# Patient Record
Sex: FEMALE | Race: WHITE | NOT HISPANIC OR LATINO | Employment: OTHER | URBAN - METROPOLITAN AREA
[De-identification: names, ages, dates, MRNs, and addresses within clinical notes are randomized per-mention and may not be internally consistent; named-entity substitution may affect disease eponyms.]

---

## 2023-05-03 LAB
ANION GAP IN SER/PLAS: 12 MMOL/L (ref 10–20)
CALCIUM (MG/DL) IN SER/PLAS: 9.2 MG/DL (ref 8.6–10.3)
CARBON DIOXIDE, TOTAL (MMOL/L) IN SER/PLAS: 32 MMOL/L (ref 21–32)
CHLORIDE (MMOL/L) IN SER/PLAS: 101 MMOL/L (ref 98–107)
CREATININE (MG/DL) IN SER/PLAS: 0.68 MG/DL (ref 0.5–1.05)
GFR FEMALE: 77 ML/MIN/1.73M2
GLUCOSE (MG/DL) IN SER/PLAS: 85 MG/DL (ref 74–99)
POTASSIUM (MMOL/L) IN SER/PLAS: 3.6 MMOL/L (ref 3.5–5.3)
SODIUM (MMOL/L) IN SER/PLAS: 141 MMOL/L (ref 136–145)
UREA NITROGEN (MG/DL) IN SER/PLAS: 40 MG/DL (ref 6–23)

## 2023-08-18 PROBLEM — R60.0 BILATERAL LEG EDEMA: Status: ACTIVE | Noted: 2023-08-18

## 2023-08-18 PROBLEM — I42.9 CARDIOMYOPATHY (MULTI): Status: ACTIVE | Noted: 2023-08-18

## 2023-08-18 PROBLEM — R07.9 CHEST PAIN OF UNCERTAIN ETIOLOGY: Status: ACTIVE | Noted: 2023-08-18

## 2023-08-18 PROBLEM — R26.81 UNSTEADINESS: Status: ACTIVE | Noted: 2023-08-18

## 2023-08-18 PROBLEM — I73.9 PAD (PERIPHERAL ARTERY DISEASE) (CMS-HCC): Status: ACTIVE | Noted: 2023-08-18

## 2023-08-18 PROBLEM — G25.0 BENIGN ESSENTIAL TREMOR: Status: ACTIVE | Noted: 2023-08-18

## 2023-08-18 PROBLEM — E03.9 HYPOTHYROIDISM: Status: ACTIVE | Noted: 2023-08-18

## 2023-08-18 PROBLEM — R10.9 ABDOMINAL PAIN: Status: ACTIVE | Noted: 2023-08-18

## 2023-08-18 PROBLEM — I10 HYPERTENSION, BENIGN: Status: ACTIVE | Noted: 2023-08-18

## 2023-08-18 PROBLEM — I25.10 ATHSCL HEART DISEASE OF NATIVE CORONARY ARTERY W/O ANG PCTRS: Status: ACTIVE | Noted: 2023-08-18

## 2023-08-18 RX ORDER — LEVOTHYROXINE SODIUM 100 UG/1
TABLET ORAL
COMMUNITY
Start: 2023-04-05 | End: 2023-10-24 | Stop reason: ALTCHOICE

## 2023-08-18 RX ORDER — FUROSEMIDE 80 MG/1
1 TABLET ORAL 2 TIMES DAILY
COMMUNITY
End: 2023-10-24 | Stop reason: DRUGHIGH

## 2023-08-18 RX ORDER — LEVOTHYROXINE SODIUM 112 UG/1
110 TABLET ORAL DAILY
COMMUNITY
Start: 2018-09-01 | End: 2024-04-23 | Stop reason: DRUGHIGH

## 2023-08-18 RX ORDER — TRAMADOL HYDROCHLORIDE 50 MG/1
1 TABLET ORAL EVERY 6 HOURS PRN
COMMUNITY
Start: 2021-07-26

## 2023-08-18 RX ORDER — PROPRANOLOL HYDROCHLORIDE 10 MG/1
1 TABLET ORAL 2 TIMES DAILY
COMMUNITY

## 2023-08-18 RX ORDER — FUROSEMIDE 40 MG/1
1 TABLET ORAL 2 TIMES DAILY
COMMUNITY
Start: 2018-05-14 | End: 2023-10-24 | Stop reason: ALTCHOICE

## 2023-08-18 RX ORDER — POLYETHYLENE GLYCOL 3350 17 G/17G
POWDER, FOR SOLUTION ORAL
COMMUNITY

## 2023-10-20 RX ORDER — ASPIRIN 81 MG/1
81 TABLET ORAL
COMMUNITY
End: 2023-10-24 | Stop reason: ALTCHOICE

## 2023-10-24 ENCOUNTER — OFFICE VISIT (OUTPATIENT)
Dept: CARDIOLOGY | Facility: CLINIC | Age: 88
End: 2023-10-24
Payer: MEDICARE

## 2023-10-24 VITALS
DIASTOLIC BLOOD PRESSURE: 50 MMHG | OXYGEN SATURATION: 97 % | SYSTOLIC BLOOD PRESSURE: 110 MMHG | HEIGHT: 65 IN | HEART RATE: 68 BPM | WEIGHT: 124 LBS | BODY MASS INDEX: 20.66 KG/M2

## 2023-10-24 DIAGNOSIS — G25.0 BENIGN ESSENTIAL TREMOR: ICD-10-CM

## 2023-10-24 DIAGNOSIS — R42 ORTHOSTATIC LIGHTHEADEDNESS: ICD-10-CM

## 2023-10-24 DIAGNOSIS — I10 HYPERTENSION, BENIGN: ICD-10-CM

## 2023-10-24 DIAGNOSIS — I25.10 ATHSCL HEART DISEASE OF NATIVE CORONARY ARTERY W/O ANG PCTRS: Primary | ICD-10-CM

## 2023-10-24 DIAGNOSIS — R60.0 BILATERAL LEG EDEMA: ICD-10-CM

## 2023-10-24 DIAGNOSIS — I42.9 CARDIOMYOPATHY, UNSPECIFIED TYPE (MULTI): ICD-10-CM

## 2023-10-24 DIAGNOSIS — I73.9 PAD (PERIPHERAL ARTERY DISEASE) (CMS-HCC): ICD-10-CM

## 2023-10-24 PROCEDURE — 3074F SYST BP LT 130 MM HG: CPT | Performed by: INTERNAL MEDICINE

## 2023-10-24 PROCEDURE — 1159F MED LIST DOCD IN RCRD: CPT | Performed by: INTERNAL MEDICINE

## 2023-10-24 PROCEDURE — 1160F RVW MEDS BY RX/DR IN RCRD: CPT | Performed by: INTERNAL MEDICINE

## 2023-10-24 PROCEDURE — 3078F DIAST BP <80 MM HG: CPT | Performed by: INTERNAL MEDICINE

## 2023-10-24 PROCEDURE — 99214 OFFICE O/P EST MOD 30 MIN: CPT | Performed by: INTERNAL MEDICINE

## 2023-10-24 RX ORDER — FUROSEMIDE 80 MG/1
80 TABLET ORAL DAILY
Qty: 30 TABLET | Refills: 11 | Status: SHIPPED | OUTPATIENT
Start: 2023-10-24 | End: 2024-03-25

## 2023-10-24 NOTE — PROGRESS NOTES
"Chief Complaint:   Follow-up, Coronary Artery Disease, Cardiomyopathy, and Hypertension (Vanessa is here for a 6 month follow up )     History Of Present Illness:    Vanessa Macedo is a 89 y.o. female presenting with CV dz .  Reports edema better.  Intermittent orthostatic LH  Patient denies chest   pain/SOB/palpitations  Activity limited but lives alone in own home     Last Recorded Vitals:  Vitals:    10/24/23 1318 10/24/23 1336   BP: 110/58 110/50   BP Location: Left arm    Patient Position: Sitting    BP Cuff Size: Adult    Pulse: 68    SpO2: 97%    Weight: 56.2 kg (124 lb)    Height: 1.651 m (5' 5\")             Allergies:  Patient has no known allergies.    Outpatient Medications:  Current Outpatient Medications   Medication Instructions    cholecalciferol, vitamin D3, (VITAMIN D3 ORAL) 1 capsule, oral, Weekly, 125 MCG    furosemide (LASIX) 80 mg, oral, Daily    levothyroxine (Synthroid, Levoxyl) 112 mcg tablet 1 tablet, oral, Daily    multivit-min/iron/FA/vit K/lut (CENTRUM SILVER WOMEN ORAL) Centrum Silver 50+Women Oral Tablet   Refills: 0        Start : 14-Sep-2017   Active    polyethylene glycol (Miralax) 17 gram/dose powder MiraLax 17 GM Oral Packet   Refills: 0       Active    propranolol (Inderal) 10 mg tablet 1 tablet, 2 times daily    traMADol (Ultram) 50 mg tablet 1 tablet, oral, Every 6 hours PRN       Physical Exam:  Constitutional:       General: Awake.      Appearance: Healthy appearance. Not in distress.   Neck:      Vascular: No JVR. JVD normal.   Pulmonary:      Effort: Pulmonary effort is normal.      Breath sounds: Normal breath sounds. No wheezing. No rhonchi. No rales.   Chest:      Chest wall: Not tender to palpatation.   Cardiovascular:      PMI at left midclavicular line. Normal rate. Regular rhythm. Normal S1. Normal S2.       Murmurs: There is no murmur.      No gallop.  No click. No rub.   Pulses:     Intact distal pulses.   Edema:     Pretibial: bilateral trace edema of the pretibial " "area.     Ankle: bilateral trace edema of the ankle.     Feet: bilateral trace edema of the feet.  Abdominal:      General: Bowel sounds are normal.      Palpations: Abdomen is soft.      Tenderness: There is no abdominal tenderness.   Musculoskeletal: Normal range of motion.         General: No tenderness.      Comments: In wheel chair Skin:     General: Skin is warm and dry.   Neurological:      General: No focal deficit present.      Mental Status: Alert and oriented to person, place and time.          Last Labs:  CBC -  Lab Results   Component Value Date    WBC 12.0 (H) 12/13/2018    HGB 12.3 12/13/2018    HCT 37.8 12/13/2018    MCV 97 12/13/2018     12/13/2018       CMP -  Lab Results   Component Value Date    CALCIUM 9.2 05/03/2023       LIPID PANEL -   No results found for: \"CHOL\", \"TRIG\", \"HDL\", \"CHHDL\", \"LDLF\", \"VLDL\", \"NHDL\"    RENAL FUNCTION PANEL -   Lab Results   Component Value Date    GLUCOSE 85 05/03/2023     05/03/2023    K 3.6 05/03/2023     05/03/2023    CO2 32 05/03/2023    ANIONGAP 12 05/03/2023    BUN 40 (H) 05/03/2023    CREATININE 0.68 05/03/2023    CALCIUM 9.2 05/03/2023        No results found for: \"BNP\", \"HGBA1C\"        Lab review: I have personally reviewed the laboratory result(s)       Problem List Items Addressed This Visit       Athscl heart disease of native coronary artery w/o ang pctrs - Primary    Overview     Minimal per cath  Impression: Pt with no definite  angina or ischemic EKG changes-on beta blocker.No ASA with easy bruising/abdominal pain in the past.  7/2021 lipids good.         Benign essential tremor    Overview     On propranolol-stable         Bilateral leg edema    Overview     Improved with diuretic         Cardiomyopathy (CMS/HCC)    Overview     Takotsubo cardiomyopathy   Impression: Resolved-follow clinically.         Hypertension, benign    Overview     BP on low side and some ortho LH-decrease diuretic         PAD (peripheral artery disease) " (CMS/HCC)    Overview     Pt denies current claudication symptoms-although activity limited ... 9/2017 PVR with LLE dz. Follows with Vascular.         Orthostatic lightheadedness    Current Assessment & Plan     DBP low-decrease Lasix to once daily                Arun Crews,

## 2023-10-24 NOTE — PATIENT INSTRUCTIONS
Change furosemide(water pill) to once daily as BP low and you get LH-can take second dose if you notice increased swelling

## 2023-12-31 ENCOUNTER — HOSPITAL ENCOUNTER (EMERGENCY)
Facility: HOSPITAL | Age: 88
Discharge: HOME | End: 2023-12-31
Payer: MEDICARE

## 2023-12-31 VITALS
BODY MASS INDEX: 20.8 KG/M2 | RESPIRATION RATE: 17 BRPM | HEART RATE: 78 BPM | OXYGEN SATURATION: 97 % | SYSTOLIC BLOOD PRESSURE: 115 MMHG | TEMPERATURE: 97.3 F | DIASTOLIC BLOOD PRESSURE: 61 MMHG | WEIGHT: 125 LBS

## 2023-12-31 DIAGNOSIS — S81.802A WOUND OF LEFT LOWER EXTREMITY, INITIAL ENCOUNTER: Primary | ICD-10-CM

## 2023-12-31 PROCEDURE — 2500000004 HC RX 250 GENERAL PHARMACY W/ HCPCS (ALT 636 FOR OP/ED): Performed by: PHYSICIAN ASSISTANT

## 2023-12-31 PROCEDURE — 90471 IMMUNIZATION ADMIN: CPT | Performed by: PHYSICIAN ASSISTANT

## 2023-12-31 PROCEDURE — 99283 EMERGENCY DEPT VISIT LOW MDM: CPT | Mod: 25

## 2023-12-31 PROCEDURE — 96372 THER/PROPH/DIAG INJ SC/IM: CPT

## 2023-12-31 PROCEDURE — 12002 RPR S/N/AX/GEN/TRNK2.6-7.5CM: CPT

## 2023-12-31 PROCEDURE — 90715 TDAP VACCINE 7 YRS/> IM: CPT | Performed by: PHYSICIAN ASSISTANT

## 2023-12-31 PROCEDURE — 2500000001 HC RX 250 WO HCPCS SELF ADMINISTERED DRUGS (ALT 637 FOR MEDICARE OP): Performed by: PHYSICIAN ASSISTANT

## 2023-12-31 PROCEDURE — 99284 EMERGENCY DEPT VISIT MOD MDM: CPT

## 2023-12-31 RX ORDER — CEPHALEXIN 500 MG/1
500 CAPSULE ORAL 3 TIMES DAILY
Qty: 21 CAPSULE | Refills: 0 | Status: SHIPPED | OUTPATIENT
Start: 2023-12-31 | End: 2024-01-07

## 2023-12-31 RX ORDER — CEPHALEXIN 500 MG/1
500 CAPSULE ORAL ONCE
Status: COMPLETED | OUTPATIENT
Start: 2023-12-31 | End: 2023-12-31

## 2023-12-31 RX ADMIN — CEPHALEXIN 500 MG: 500 CAPSULE ORAL at 17:07

## 2023-12-31 RX ADMIN — TETANUS TOXOID, REDUCED DIPHTHERIA TOXOID AND ACELLULAR PERTUSSIS VACCINE, ADSORBED 0.5 ML: 5; 2.5; 8; 8; 2.5 SUSPENSION INTRAMUSCULAR at 17:09

## 2023-12-31 ASSESSMENT — COLUMBIA-SUICIDE SEVERITY RATING SCALE - C-SSRS
1. IN THE PAST MONTH, HAVE YOU WISHED YOU WERE DEAD OR WISHED YOU COULD GO TO SLEEP AND NOT WAKE UP?: NO
6. HAVE YOU EVER DONE ANYTHING, STARTED TO DO ANYTHING, OR PREPARED TO DO ANYTHING TO END YOUR LIFE?: NO
2. HAVE YOU ACTUALLY HAD ANY THOUGHTS OF KILLING YOURSELF?: NO

## 2023-12-31 NOTE — ED PROVIDER NOTES
HPI   Chief Complaint   Patient presents with    Wound Check     Pt arrives private auto from home w daughter. States she cut back of her left leg/calf on stairs on Thursday and has increased swelling/pain. Daughter states she has been changing bandages and that pt will not stop bleeding from site. Bleeding controlled in triage with tape from home. Pt is not on anticoagulants.       HPIThis is 89-year-old female who injured her left calf on a sharp metal on Thursday.  She sustained a laceration because of this.  She presents for further evaluation.  Daughter keeps changing bandage but it continues to bleed.  It seems to be slowing down a bit but she presents here for further recommendations.  Patient is not up-to-date on her tetanus shot.  Denies any allergies.  Is not on any blood thinners.  Is on a water pill.  Is any red streaking or any fevers.  No chest pain shortness of breath cough cold numbness ting weakness headache or visual symptoms.  It is sore when she cut it.  There is 1 area that was more deeper.  This is where her daughter believes is bleeding from                    No data recorded                Patient History   Past Medical History:   Diagnosis Date    Overweight     Overweight (BMI 25.0-29.9)    Personal history of other malignant neoplasm of large intestine     History of malignant neoplasm of colon     Past Surgical History:   Procedure Laterality Date    APPENDECTOMY  03/26/2018    Appendectomy    CHOLECYSTECTOMY  09/14/2017    Cholecystectomy    OTHER SURGICAL HISTORY  06/08/2020    Colectomy    OTHER SURGICAL HISTORY  06/08/2020    Cardiac catheterization    TONSILLECTOMY  09/14/2017    Tonsillectomy     Family History   Problem Relation Name Age of Onset    Other (no family history of cardiac disease) Other       Social History     Tobacco Use    Smoking status: Not on file    Smokeless tobacco: Not on file   Substance Use Topics    Alcohol use: Not on file    Drug use: Not on file        Physical Exam   ED Triage Vitals [12/31/23 1625]   Temp Heart Rate Resp BP   36.3 °C (97.3 °F) 80 16 113/56      SpO2 Temp src Heart Rate Source Patient Position   98 % -- -- --      BP Location FiO2 (%)     -- --       Physical Exam family history social history and allergies reviewed    Review of systems as noted in history of present illness otherwise negative    GEN: vitals as noted. No distress. Afebrile  NECK: Supple.  No adenopathy. FROM.  CARDIAC: Regular rate and rhythm no murmur gallop or rubs  PULMONARY: Equal breath sounds bilaterally.  No rales rhonchi or wheezes.  BACK: Nontender throughout. FROM  LOWER EXTREMITY: Bilateral lower extremity swelling  There is a 6 cm laceration curved aspect overlying the left calf.  Flap has stuck to skin superiorly and there is 1 area that is slightly deeper but with minimal oozing noted currently.  It is tender to palpation.  No signs of infection.  No red streaking up the leg.  FROM with no  pain to eversion and inversion.  No ecchymosis noted. DP +2/4 bilateral. NVIT .  NO Swelling over foot.  The remainder of the extremity is nontender ,specifically, nontender over the knee and fibular head.  SKIN: intact without obvious rash or open wounds noted  NEURO: grossly intact     MDM:    ED Course & MDM   Diagnoses as of 12/31/23 1710   Wound of left lower extremity, initial encounter   I did try to see if any bleeding would occur with manipulation.  I was unable to reapproximate any of the skin that had traveled up and dried onto the wound.  There was slight oozing where I did put some Surgicel into place.  Patient received tetanus shot and we home-going on Keflex.    Medical Decision Making    Follow-up with primary care doctor.  Keep covered with bandage applied.  Wound will heal by second intent.  Return if worsening symptoms.  Do not manipulate wound for the next few days do not get wet.  After 48 hours may rebandage top dressing.  Maybe check for any red  streaking superiorly.  Procedure  Procedures     Charu Frazier PA-C  12/31/23 1959

## 2023-12-31 NOTE — DISCHARGE INSTRUCTIONS
Do not manipulate wound for 48 hours.  Do not get wet.  May remove outer bandage to recheck leg aspect.  Watch for signs of infection such as pus increased redness streaking up the leg.  Follow-up with primary care doctor.  Antibiotics given to prevent infection.  Tetanus booster given as well

## 2024-03-25 DIAGNOSIS — R60.0 LOCALIZED EDEMA: ICD-10-CM

## 2024-03-25 RX ORDER — FUROSEMIDE 80 MG/1
80 TABLET ORAL DAILY
Qty: 90 TABLET | Refills: 3 | Status: SHIPPED | OUTPATIENT
Start: 2024-03-25 | End: 2024-04-23 | Stop reason: DRUGHIGH

## 2024-04-23 ENCOUNTER — OFFICE VISIT (OUTPATIENT)
Dept: CARDIOLOGY | Facility: CLINIC | Age: 89
End: 2024-04-23
Payer: MEDICARE

## 2024-04-23 VITALS — HEART RATE: 62 BPM | SYSTOLIC BLOOD PRESSURE: 125 MMHG | OXYGEN SATURATION: 97 % | DIASTOLIC BLOOD PRESSURE: 58 MMHG

## 2024-04-23 DIAGNOSIS — R60.0 LOCALIZED EDEMA: ICD-10-CM

## 2024-04-23 DIAGNOSIS — I42.9 CARDIOMYOPATHY, UNSPECIFIED TYPE (MULTI): ICD-10-CM

## 2024-04-23 DIAGNOSIS — I25.10 ATHSCL HEART DISEASE OF NATIVE CORONARY ARTERY W/O ANG PCTRS: Primary | ICD-10-CM

## 2024-04-23 DIAGNOSIS — R60.0 BILATERAL LEG EDEMA: ICD-10-CM

## 2024-04-23 DIAGNOSIS — I10 HYPERTENSION, BENIGN: ICD-10-CM

## 2024-04-23 DIAGNOSIS — R42 ORTHOSTATIC LIGHTHEADEDNESS: ICD-10-CM

## 2024-04-23 DIAGNOSIS — G25.0 BENIGN ESSENTIAL TREMOR: ICD-10-CM

## 2024-04-23 PROCEDURE — 1159F MED LIST DOCD IN RCRD: CPT | Performed by: INTERNAL MEDICINE

## 2024-04-23 PROCEDURE — 3078F DIAST BP <80 MM HG: CPT | Performed by: INTERNAL MEDICINE

## 2024-04-23 PROCEDURE — 1160F RVW MEDS BY RX/DR IN RCRD: CPT | Performed by: INTERNAL MEDICINE

## 2024-04-23 PROCEDURE — 3074F SYST BP LT 130 MM HG: CPT | Performed by: INTERNAL MEDICINE

## 2024-04-23 PROCEDURE — 93000 ELECTROCARDIOGRAM COMPLETE: CPT | Performed by: INTERNAL MEDICINE

## 2024-04-23 PROCEDURE — 99213 OFFICE O/P EST LOW 20 MIN: CPT | Performed by: INTERNAL MEDICINE

## 2024-04-23 RX ORDER — FUROSEMIDE 80 MG/1
TABLET ORAL
Qty: 90 TABLET | Refills: 3 | Status: SHIPPED | OUTPATIENT
Start: 2024-04-23

## 2024-04-23 RX ORDER — LEVOTHYROXINE SODIUM 100 UG/1
100 TABLET ORAL DAILY
COMMUNITY
Start: 2024-01-30

## 2024-04-23 NOTE — PROGRESS NOTES
Chief Complaint:   Follow-up (Patient is here for a follow up)     History Of Present Illness:    Vanessa Macedo is a 89 y.o. female presenting with CV dz .  Fell going up steps -deep laceration about ankle  Activity limited by ankle issue  Resulted in increased edema L leg  Improved orthostatic LH  Patient denies chest   pain/SOB/palpitations       Last Recorded Vitals:  Vitals:    04/23/24 1336 04/23/24 1355   BP: 113/71 125/58   BP Location: Left arm    Patient Position: Sitting    BP Cuff Size: Adult    Pulse: 62    SpO2: 97%             Allergies:  Patient has no known allergies.    Outpatient Medications:  Current Outpatient Medications   Medication Instructions    cholecalciferol, vitamin D3, (VITAMIN D3 ORAL) 1 capsule, oral, Once Weekly, 125 MCG    furosemide (Lasix) 80 mg tablet 80 mg AM<BR>40 mg PM    levothyroxine (SYNTHROID, LEVOXYL) 100 mcg, oral, Daily    multivit-min/iron/FA/vit K/lut (CENTRUM SILVER WOMEN ORAL) Centrum Silver 50+Women Oral Tablet   Refills: 0        Start : 14-Sep-2017   Active    polyethylene glycol (Miralax) 17 gram/dose powder MiraLax 17 GM Oral Packet   Refills: 0       Active    propranolol (Inderal) 10 mg tablet 1 tablet, 2 times daily    traMADol (Ultram) 50 mg tablet 1 tablet, oral, Every 6 hours PRN       Physical Exam:  Constitutional:       General: Awake.      Appearance: Healthy appearance. Not in distress.   Neck:      Vascular: No JVR. JVD normal.   Pulmonary:      Effort: Pulmonary effort is normal.      Breath sounds: Normal breath sounds. No wheezing. No rhonchi. No rales.   Chest:      Chest wall: Not tender to palpatation.   Cardiovascular:      PMI at left midclavicular line. Normal rate. Regular rhythm. Normal S1. Normal S2.       Murmurs: There is no murmur.      No gallop.  No click. No rub.   Pulses:     Intact distal pulses.   Edema:     Pretibial: bilateral trace edema of the pretibial area.     Ankle: bilateral trace edema of the ankle.     Feet:  "bilateral trace edema of the feet.  Abdominal:      General: Bowel sounds are normal.      Palpations: Abdomen is soft.      Tenderness: There is no abdominal tenderness.   Musculoskeletal: Normal range of motion.         General: No tenderness.      Comments: In wheel chair  L leg wrapped Skin:     General: Skin is warm and dry.   Neurological:      General: No focal deficit present.      Mental Status: Alert and oriented to person, place and time.          Last Labs:  CBC -  Lab Results   Component Value Date    WBC 12.0 (H) 12/13/2018    HGB 12.3 12/13/2018    HCT 37.8 12/13/2018    MCV 97 12/13/2018     12/13/2018       CMP -  Lab Results   Component Value Date    CALCIUM 9.2 05/03/2023       LIPID PANEL -   No results found for: \"CHOL\", \"TRIG\", \"HDL\", \"CHHDL\", \"LDLF\", \"VLDL\", \"NHDL\"    RENAL FUNCTION PANEL -   Lab Results   Component Value Date    GLUCOSE 85 05/03/2023     05/03/2023    K 3.6 05/03/2023     05/03/2023    CO2 32 05/03/2023    ANIONGAP 12 05/03/2023    BUN 40 (H) 05/03/2023    CREATININE 0.68 05/03/2023    CALCIUM 9.2 05/03/2023        No results found for: \"BNP\", \"HGBA1C\"        Lab review: I have personally reviewed the laboratory result(s)       Problem List Items Addressed This Visit       Athscl heart disease of native coronary artery w/o ang pctrs - Primary    Overview     Minimal per cath  Impression: Pt with no definite  angina or ischemic EKG changes-on beta blocker.No ASA with easy bruising/abdominal pain in the past.  7/2021 lipids good.         Benign essential tremor    Overview     On propranolol-stable         Bilateral leg edema    Overview     Improved with diuretic         Cardiomyopathy (Multi)    Overview     Takotsubo cardiomyopathy   Impression: Resolved-follow clinically.         Hypertension, benign    Overview     Stable on current meds         Orthostatic lightheadedness    Relevant Orders    ECG 12 Lead (Completed)     Other Visit Diagnoses       " Localized edema        Relevant Medications    furosemide (Lasix) 80 mg tablet                Arun Crews DO

## 2024-05-06 ENCOUNTER — OFFICE VISIT (OUTPATIENT)
Dept: WOUND CARE | Facility: CLINIC | Age: 89
End: 2024-05-06
Payer: MEDICARE

## 2024-05-06 PROCEDURE — 99204 OFFICE O/P NEW MOD 45 MIN: CPT | Performed by: PODIATRIST

## 2024-05-06 PROCEDURE — 29580 STRAPPING UNNA BOOT: CPT | Mod: LT

## 2024-05-06 PROCEDURE — 99212 OFFICE O/P EST SF 10 MIN: CPT | Mod: 25

## 2024-05-14 ENCOUNTER — OFFICE VISIT (OUTPATIENT)
Dept: WOUND CARE | Facility: CLINIC | Age: 89
End: 2024-05-14
Payer: MEDICARE

## 2024-05-14 PROCEDURE — 11042 DBRDMT SUBQ TIS 1ST 20SQCM/<: CPT

## 2024-05-21 ENCOUNTER — OFFICE VISIT (OUTPATIENT)
Dept: WOUND CARE | Facility: CLINIC | Age: 89
End: 2024-05-21
Payer: MEDICARE

## 2024-05-21 PROCEDURE — 99212 OFFICE O/P EST SF 10 MIN: CPT

## 2024-05-21 PROCEDURE — 11721 DEBRIDE NAIL 6 OR MORE: CPT

## 2024-10-22 PROBLEM — L97.909 CHRONIC ULCER OF LOWER EXTREMITY (MULTI): Status: ACTIVE | Noted: 2024-10-22

## 2024-10-22 PROBLEM — H61.21 IMPACTED CERUMEN OF RIGHT EAR: Status: ACTIVE | Noted: 2024-10-22

## 2024-10-22 PROBLEM — M25.572 SINUS TARSI SYNDROME OF LEFT ANKLE: Status: ACTIVE | Noted: 2024-10-22

## 2024-10-22 PROBLEM — R60.0 LOCALIZED EDEMA: Status: ACTIVE | Noted: 2024-10-22

## 2024-10-23 ENCOUNTER — APPOINTMENT (OUTPATIENT)
Dept: CARDIOLOGY | Facility: CLINIC | Age: 89
End: 2024-10-23
Payer: MEDICARE

## 2024-10-23 VITALS — DIASTOLIC BLOOD PRESSURE: 60 MMHG | HEART RATE: 70 BPM | OXYGEN SATURATION: 97 % | SYSTOLIC BLOOD PRESSURE: 118 MMHG

## 2024-10-23 DIAGNOSIS — I73.9 PAD (PERIPHERAL ARTERY DISEASE) (CMS-HCC): ICD-10-CM

## 2024-10-23 DIAGNOSIS — I10 HYPERTENSION, BENIGN: ICD-10-CM

## 2024-10-23 DIAGNOSIS — I42.9 CARDIOMYOPATHY, UNSPECIFIED TYPE (MULTI): ICD-10-CM

## 2024-10-23 DIAGNOSIS — R60.0 BILATERAL LEG EDEMA: ICD-10-CM

## 2024-10-23 DIAGNOSIS — I25.10 ATHSCL HEART DISEASE OF NATIVE CORONARY ARTERY W/O ANG PCTRS: Primary | ICD-10-CM

## 2024-10-23 DIAGNOSIS — I65.29 STENOSIS OF CAROTID ARTERY, UNSPECIFIED LATERALITY: ICD-10-CM

## 2024-10-23 DIAGNOSIS — G25.0 BENIGN ESSENTIAL TREMOR: ICD-10-CM

## 2024-10-23 PROCEDURE — 99214 OFFICE O/P EST MOD 30 MIN: CPT | Performed by: INTERNAL MEDICINE

## 2024-10-23 PROCEDURE — G2211 COMPLEX E/M VISIT ADD ON: HCPCS | Performed by: INTERNAL MEDICINE

## 2024-10-23 PROCEDURE — 1159F MED LIST DOCD IN RCRD: CPT | Performed by: INTERNAL MEDICINE

## 2024-10-23 PROCEDURE — 3078F DIAST BP <80 MM HG: CPT | Performed by: INTERNAL MEDICINE

## 2024-10-23 PROCEDURE — 3074F SYST BP LT 130 MM HG: CPT | Performed by: INTERNAL MEDICINE

## 2024-10-23 RX ORDER — GUAIFENESIN 1200 MG
325 TABLET, EXTENDED RELEASE 12 HR ORAL
COMMUNITY
Start: 2024-06-03

## 2024-10-23 RX ORDER — ROSUVASTATIN CALCIUM 5 MG/1
TABLET, COATED ORAL
COMMUNITY
Start: 2024-10-13

## 2024-10-23 NOTE — PROGRESS NOTES
Chief Complaint:   Follow-up (Patient is here for a follow up/)     History Of Present Illness:    Vanessa Macedo is a 89 y.o. female presenting with CV dz .  Legs are weak-uses walker    Patient denies chest pain/SOB/palpitations/dizziness/lightheadedness/edema/claudication         Last Recorded Vitals:  Vitals:    10/23/24 1306 10/23/24 1332   BP: 147/65 118/60   BP Location: Left arm    Patient Position: Sitting    BP Cuff Size: Adult    Pulse: 70    SpO2: 97%             Allergies:  Patient has no known allergies.    Outpatient Medications:  Current Outpatient Medications   Medication Instructions    acetaminophen (TYLENOL) 325 mg    cholecalciferol, vitamin D3, (VITAMIN D3 ORAL) 1 capsule, Once Weekly    furosemide (Lasix) 80 mg tablet 80 mg AM  40 mg PM    levothyroxine (SYNTHROID, LEVOXYL) 100 mcg, Daily    multivit-min/iron/FA/vit K/lut (CENTRUM SILVER WOMEN ORAL) Centrum Silver 50+Women Oral Tablet   Refills: 0        Start : 14-Sep-2017   Active    polyethylene glycol (Miralax) 17 gram/dose powder MiraLax 17 GM Oral Packet   Refills: 0       Active    propranolol (Inderal) 10 mg tablet 1 tablet, 2 times daily    rosuvastatin (Crestor) 5 mg tablet TAKE 1 TABLET AT NIGHT ON MONDAY, WEDNESDAY AND FRIDAY    traMADol (Ultram) 50 mg tablet 1 tablet, Every 6 hours PRN       Physical Exam:  Constitutional:       General: Awake.      Appearance: Healthy appearance. Not in distress.   Neck:      Vascular: No JVR. JVD normal.   Pulmonary:      Effort: Pulmonary effort is normal.      Breath sounds: Normal breath sounds. No wheezing. No rhonchi. No rales.   Chest:      Chest wall: Not tender to palpatation.   Cardiovascular:      PMI at left midclavicular line. Normal rate. Regular rhythm. Normal S1. Normal S2.       Murmurs: There is no murmur.      No gallop.  No click. No rub.   Pulses:     Intact distal pulses.   Edema:     Pretibial: bilateral trace edema of the pretibial area.     Ankle: bilateral trace edema  "of the ankle.     Feet: bilateral trace edema of the feet.  Abdominal:      General: Bowel sounds are normal.      Palpations: Abdomen is soft.      Tenderness: There is no abdominal tenderness.   Musculoskeletal: Normal range of motion.         General: No tenderness.      Comments: In wheel chair  L leg wrapped Skin:     General: Skin is warm and dry.   Neurological:      General: No focal deficit present.      Mental Status: Alert and oriented to person, place and time.          Last Labs:  CBC -  Lab Results   Component Value Date    WBC 12.0 (H) 12/13/2018    HGB 12.3 12/13/2018    HCT 37.8 12/13/2018    MCV 97 12/13/2018     12/13/2018       CMP -  Lab Results   Component Value Date    CALCIUM 9.2 05/03/2023       LIPID PANEL -   No results found for: \"CHOL\", \"TRIG\", \"HDL\", \"CHHDL\", \"LDLF\", \"VLDL\", \"NHDL\"    RENAL FUNCTION PANEL -   Lab Results   Component Value Date    GLUCOSE 85 05/03/2023     05/03/2023    K 3.6 05/03/2023     05/03/2023    CO2 32 05/03/2023    ANIONGAP 12 05/03/2023    BUN 40 (H) 05/03/2023    CREATININE 0.68 05/03/2023    CALCIUM 9.2 05/03/2023 6/2024  K=3.8  Cr=.54  No results found for: \"BNP\", \"HGBA1C\"        Lab review: I have personally reviewed the laboratory result(s)       Problem List Items Addressed This Visit       Athscl heart disease of native coronary artery w/o ang pctrs - Primary    Overview     Minimal per cath  Pt with no   angina -on beta blocker.  No ASA with easy bruising/abdominal pain in the past.  6/2024 lipids OK         Benign essential tremor    Overview     On propranolol-stable         Bilateral leg edema    Overview     Improved with diuretic         Cardiomyopathy    Overview     Takotsubo cardiomyopathy   Impression: Resolved-follow clinically.         Hypertension, benign    Overview     Stable on current meds         PAD (peripheral artery disease) (CMS-[facility])    Overview     Pt denies current claudication symptoms-although activity " limited ... 9/2017 PVR with LLE dz. Follows with Vascular.         Carotid stenosis    Overview     Moderate L dz per 7/2024 duplex                Arun Crews, DO

## 2025-01-14 DIAGNOSIS — R60.0 LOCALIZED EDEMA: ICD-10-CM

## 2025-01-14 RX ORDER — FUROSEMIDE 80 MG/1
TABLET ORAL
COMMUNITY
Start: 2025-01-14

## 2025-04-23 ENCOUNTER — OFFICE VISIT (OUTPATIENT)
Dept: CARDIOLOGY | Facility: CLINIC | Age: OVER 89
End: 2025-04-23
Payer: MEDICARE

## 2025-04-23 VITALS
WEIGHT: 125 LBS | DIASTOLIC BLOOD PRESSURE: 65 MMHG | SYSTOLIC BLOOD PRESSURE: 112 MMHG | HEART RATE: 64 BPM | BODY MASS INDEX: 20.8 KG/M2 | OXYGEN SATURATION: 90 %

## 2025-04-23 DIAGNOSIS — R60.0 BILATERAL LEG EDEMA: ICD-10-CM

## 2025-04-23 DIAGNOSIS — I42.9 CARDIOMYOPATHY, UNSPECIFIED TYPE (MULTI): ICD-10-CM

## 2025-04-23 DIAGNOSIS — R60.0 LOCALIZED EDEMA: ICD-10-CM

## 2025-04-23 DIAGNOSIS — I73.9 PAD (PERIPHERAL ARTERY DISEASE) (CMS-HCC): ICD-10-CM

## 2025-04-23 DIAGNOSIS — I65.29 STENOSIS OF CAROTID ARTERY, UNSPECIFIED LATERALITY: ICD-10-CM

## 2025-04-23 DIAGNOSIS — I25.10 ATHSCL HEART DISEASE OF NATIVE CORONARY ARTERY W/O ANG PCTRS: ICD-10-CM

## 2025-04-23 DIAGNOSIS — I10 HYPERTENSION, BENIGN: Primary | ICD-10-CM

## 2025-04-23 LAB
ATRIAL RATE: 64 BPM
P AXIS: 67 DEGREES
P OFFSET: 170 MS
P ONSET: 119 MS
PR INTERVAL: 184 MS
Q ONSET: 211 MS
QRS COUNT: 10 BEATS
QRS DURATION: 166 MS
QT INTERVAL: 490 MS
QTC CALCULATION(BAZETT): 505 MS
QTC FREDERICIA: 500 MS
R AXIS: -49 DEGREES
T AXIS: 109 DEGREES
T OFFSET: 456 MS
VENTRICULAR RATE: 64 BPM

## 2025-04-23 PROCEDURE — 99214 OFFICE O/P EST MOD 30 MIN: CPT | Performed by: INTERNAL MEDICINE

## 2025-04-23 PROCEDURE — G2211 COMPLEX E/M VISIT ADD ON: HCPCS | Performed by: INTERNAL MEDICINE

## 2025-04-23 PROCEDURE — 93005 ELECTROCARDIOGRAM TRACING: CPT | Performed by: INTERNAL MEDICINE

## 2025-04-23 PROCEDURE — 99212 OFFICE O/P EST SF 10 MIN: CPT | Performed by: INTERNAL MEDICINE

## 2025-04-23 PROCEDURE — 3078F DIAST BP <80 MM HG: CPT | Performed by: INTERNAL MEDICINE

## 2025-04-23 PROCEDURE — 1159F MED LIST DOCD IN RCRD: CPT | Performed by: INTERNAL MEDICINE

## 2025-04-23 PROCEDURE — 93010 ELECTROCARDIOGRAM REPORT: CPT | Performed by: INTERNAL MEDICINE

## 2025-04-23 PROCEDURE — 1036F TOBACCO NON-USER: CPT | Performed by: INTERNAL MEDICINE

## 2025-04-23 PROCEDURE — 3074F SYST BP LT 130 MM HG: CPT | Performed by: INTERNAL MEDICINE

## 2025-04-23 RX ORDER — FUROSEMIDE 80 MG/1
80 TABLET ORAL
Qty: 180 TABLET | Refills: 3 | Status: SHIPPED | OUTPATIENT
Start: 2025-04-23 | End: 2025-04-23 | Stop reason: SDUPTHER

## 2025-04-23 RX ORDER — FUROSEMIDE 80 MG/1
80 TABLET ORAL
Qty: 180 TABLET | Refills: 3 | Status: SHIPPED | OUTPATIENT
Start: 2025-04-23 | End: 2026-04-23

## 2025-04-23 NOTE — PROGRESS NOTES
Chief Complaint:   6 month follow up , Coronary Artery Disease, and Hypertension     History Of Present Illness:    Vanessa Macedo is a 89 y.o. female presenting with CV dz .  Legs are weak-in WC today  Doing home PT  Has lower extremity edema-L more than R    Patient denies chest pain/SOB/palpitations/dizziness/lightheadedness           Last Recorded Vitals:  Vitals:    04/23/25 1212 04/23/25 1227   BP: 130/68 112/65   BP Location: Right arm    Patient Position: Sitting    BP Cuff Size: Adult    Pulse: 64    SpO2: 90%    Weight: 56.7 kg (125 lb)               Allergies:  Patient has no known allergies.    Outpatient Medications:  Current Outpatient Medications   Medication Instructions    cholecalciferol, vitamin D3, (VITAMIN D3 ORAL) 1 capsule, Once Weekly    furosemide (Lasix) 80 mg tablet 80 mg AM  40 mg PM    levothyroxine (SYNTHROID, LEVOXYL) 100 mcg, Daily    multivit-min/iron/FA/vit K/lut (CENTRUM SILVER WOMEN ORAL) Centrum Silver 50+Women Oral Tablet   Refills: 0        Start : 14-Sep-2017   Active    polyethylene glycol (Miralax) 17 gram/dose powder MiraLax 17 GM Oral Packet   Refills: 0       Active    propranolol (Inderal) 10 mg tablet 1 tablet, 2 times daily    rosuvastatin (Crestor) 5 mg tablet TAKE 1 TABLET AT NIGHT ON MONDAY, WEDNESDAY AND FRIDAY    traMADol (Ultram) 50 mg tablet 1 tablet, Every 6 hours PRN       Physical Exam:  Constitutional:       General: Awake.      Appearance: Healthy appearance. Not in distress.   Neck:      Vascular: No JVR. JVD normal.   Pulmonary:      Effort: Pulmonary effort is normal.      Breath sounds: Normal breath sounds. No wheezing. No rhonchi. No rales.   Chest:      Chest wall: Not tender to palpatation.   Cardiovascular:      PMI at left midclavicular line. Normal rate. Regular rhythm. Normal S1. Normal S2.       Murmurs: There is no murmur.      No gallop.  No click. No rub.   Pulses:     Intact distal pulses.   Edema:     Pretibial: bilateral trace edema of  "the pretibial area.     Ankle: bilateral trace edema of the ankle.     Feet: bilateral trace edema of the feet.  Abdominal:      General: Bowel sounds are normal.      Palpations: Abdomen is soft.      Tenderness: There is no abdominal tenderness.   Musculoskeletal: Normal range of motion.         General: No tenderness.      Comments: In wheel chair  L leg wrapped Skin:     General: Skin is warm and dry.   Neurological:      General: No focal deficit present.      Mental Status: Alert and oriented to person, place and time.          Last Labs:  CBC -  Lab Results   Component Value Date    WBC 12.0 (H) 12/13/2018    HGB 12.3 12/13/2018    HCT 37.8 12/13/2018    MCV 97 12/13/2018     12/13/2018       CMP -  Lab Results   Component Value Date    CALCIUM 9.2 05/03/2023       LIPID PANEL -   No results found for: \"CHOL\", \"TRIG\", \"HDL\", \"CHHDL\", \"LDLF\", \"VLDL\", \"NHDL\"    RENAL FUNCTION PANEL -   Lab Results   Component Value Date    GLUCOSE 85 05/03/2023     05/03/2023    K 3.6 05/03/2023     05/03/2023    CO2 32 05/03/2023    ANIONGAP 12 05/03/2023    BUN 40 (H) 05/03/2023    CREATININE 0.68 05/03/2023    CALCIUM 9.2 05/03/2023 6/2024  K=3.8  Cr=.54  No results found for: \"BNP\", \"HGBA1C\"      Lab review: I have personally reviewed the laboratory result(s)       Problem List Items Addressed This Visit       Athscl heart disease of native coronary artery w/o ang pctrs    Overview   Minimal per cath  Pt with no   angina -on beta blocker.  No ASA with easy bruising/abdominal pain in the past.  6/2024 lipids OK         Relevant Orders    ECG 12 lead (Clinic Performed)    Bilateral leg edema    Overview   Suspect more peripheral than central  No thrombus per 3/2025 venous ultrasound  Titrate up furosemide as the edema is bothersome to the patient  Recheck BMP 1 week         Cardiomyopathy    Overview   Takotsubo cardiomyopathy   Impression: Resolved-follow clinically.         Hypertension, benign - " Primary    Overview   Stable on current meds         PAD (peripheral artery disease) (CMS-HCC)    Overview   Pt denies current claudication symptoms-although activity limited ... 9/2017 PVR with LLE dz. Follows with Vascular.         Localized edema    Carotid stenosis    Overview   Moderate L dz per 7/2024 duplex  Stable per exam          Increase furosemide to 80 mg twice daily for swelling  BMP 1 week      Arun Crews, DO

## 2025-05-01 LAB
ANION GAP SERPL CALCULATED.4IONS-SCNC: 9 MMOL/L (CALC) (ref 7–17)
BUN SERPL-MCNC: 41 MG/DL (ref 7–25)
BUN/CREAT SERPL: 62 (CALC) (ref 6–22)
CALCIUM SERPL-MCNC: 9 MG/DL (ref 8.6–10.4)
CHLORIDE SERPL-SCNC: 103 MMOL/L (ref 98–110)
CO2 SERPL-SCNC: 30 MMOL/L (ref 20–32)
CREAT SERPL-MCNC: 0.66 MG/DL (ref 0.6–0.95)
EGFRCR SERPLBLD CKD-EPI 2021: 77 ML/MIN/1.73M2
GLUCOSE SERPL-MCNC: 99 MG/DL (ref 65–99)
POTASSIUM SERPL-SCNC: 3.3 MMOL/L (ref 3.5–5.3)
SODIUM SERPL-SCNC: 142 MMOL/L (ref 135–146)

## 2025-05-02 ENCOUNTER — TELEPHONE (OUTPATIENT)
Dept: CARDIOLOGY | Facility: CLINIC | Age: OVER 89
End: 2025-05-02
Payer: MEDICARE

## 2025-05-02 DIAGNOSIS — I25.10 ATHSCL HEART DISEASE OF NATIVE CORONARY ARTERY W/O ANG PCTRS: ICD-10-CM

## 2025-05-02 NOTE — TELEPHONE ENCOUNTER
----- Message from Arun Crews sent at 5/2/2025  2:20 PM EDT -----  Regarding: FW:  Potassium 20 meq daily  ----- Message -----  From: [facility] [employer] Results In  Sent: 5/1/2025  10:05 PM EDT  To: Arun Crews,

## 2025-05-05 RX ORDER — POTASSIUM CHLORIDE 20 MEQ/1
20 TABLET, EXTENDED RELEASE ORAL DAILY
Qty: 30 TABLET | Refills: 11 | Status: SHIPPED | OUTPATIENT
Start: 2025-05-05 | End: 2026-05-05